# Patient Record
Sex: FEMALE | URBAN - METROPOLITAN AREA
[De-identification: names, ages, dates, MRNs, and addresses within clinical notes are randomized per-mention and may not be internally consistent; named-entity substitution may affect disease eponyms.]

---

## 2017-06-02 ENCOUNTER — HOSPITAL ENCOUNTER (EMERGENCY)
Facility: MEDICAL CENTER | Age: 27
End: 2017-06-02
Attending: EMERGENCY MEDICINE

## 2017-06-02 VITALS
DIASTOLIC BLOOD PRESSURE: 87 MMHG | RESPIRATION RATE: 18 BRPM | OXYGEN SATURATION: 100 % | HEART RATE: 118 BPM | HEIGHT: 62 IN | SYSTOLIC BLOOD PRESSURE: 127 MMHG | BODY MASS INDEX: 19.43 KG/M2 | WEIGHT: 105.6 LBS | TEMPERATURE: 98.5 F

## 2017-06-02 VITALS
OXYGEN SATURATION: 98 % | BODY MASS INDEX: 22.08 KG/M2 | HEART RATE: 98 BPM | RESPIRATION RATE: 18 BRPM | HEIGHT: 62 IN | WEIGHT: 120 LBS

## 2017-06-02 DIAGNOSIS — F10.921 ALCOHOL INTOXICATION, WITH DELIRIUM (HCC): ICD-10-CM

## 2017-06-02 DIAGNOSIS — F10.10 ALCOHOL ABUSE: ICD-10-CM

## 2017-06-02 PROCEDURE — 99284 EMERGENCY DEPT VISIT MOD MDM: CPT

## 2017-06-02 ASSESSMENT — PAIN SCALES - GENERAL: PAINLEVEL_OUTOF10: 0

## 2017-06-02 NOTE — ED NOTES
Pt walked out of ED.  Per charge RN, pt walked past charge desk talking on phone and walked out front doors.  ERP aware.  [place] steady per charge RN.
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
## 2017-06-02 NOTE — ED NOTES
Pt attempting to crawl out of bed, pt educated on importance of using the call light. RN assisted pt to restroom.
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
